# Patient Record
Sex: FEMALE | Race: WHITE | NOT HISPANIC OR LATINO
[De-identification: names, ages, dates, MRNs, and addresses within clinical notes are randomized per-mention and may not be internally consistent; named-entity substitution may affect disease eponyms.]

---
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
## 2024-03-11 ENCOUNTER — APPOINTMENT (OUTPATIENT)
Dept: HEART AND VASCULAR | Facility: CLINIC | Age: 19
End: 2024-03-11
Payer: COMMERCIAL

## 2024-03-11 DIAGNOSIS — M79.662 PAIN IN LEFT LOWER LEG: ICD-10-CM

## 2024-03-11 DIAGNOSIS — Q27.9 CONGENITAL MALFORMATION OF PERIPHERAL VASCULAR SYSTEM, UNSPECIFIED: ICD-10-CM

## 2024-03-11 PROCEDURE — 99204 OFFICE O/P NEW MOD 45 MIN: CPT

## 2024-03-11 NOTE — PHYSICAL EXAM
[Alert] : alert [No Acute Distress] : no acute distress [Well Nourished] : well nourished [Healthy Appearance] : healthy appearance [Normal Sclera/Conjunctiva] : normal sclera/conjunctiva [No Respiratory Distress] : no respiratory distress [No Edema] : there was no peripheral edema [No Varicosities] : there were no varicosital changes [Normal Gait] : normal gait [No Rash] : no rash [No Skin Lesions] : no skin lesions [Oriented x3] : oriented to person, place, and time [Fully active, able to carry on all pre-disease performance without restriction] : Fully active, able to carry on all pre-disease performance without restriction

## 2024-03-12 NOTE — DATA REVIEWED
[FreeTextEntry1] : Procedure history 2014 - DSE w/ STS, L calf venous malformation. Venogram w/ patent deep system.  2020 - DSE L calf

## 2024-03-12 NOTE — HISTORY OF PRESENT ILLNESS
[FreeTextEntry1] : Lilliam Hernandez is a 18-year-old female with L calf venous malformation who was last treated with DSE w/ STS in 2020. She also had prior treatment in 2014.Since her last visit she did not need any further treatment. She is here today for follow up and mild- moderate pain to LLE. She is able to do all her regular activities including hiking but feels achy when standing all day.  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 She uses compression stocking on her clinical days which helps with her symptoms. No imaging done since 2020. She is doing well otherwise with no allergies.  Plan: - DSE w/ bleomycin in May

## 2024-03-12 NOTE — END OF VISIT
[Time Spent: ___ minutes] : I have spent [unfilled] minutes of time on the encounter. [>50% of the face to face encounter time was spent on counseling and/or coordination of care for ___] : Greater than 50% of the face to face encounter time was spent on counseling and/or coordination of care for [unfilled] [FreeTextEntry3] : I, Dr. Rivas, personally performed the evaluation and management (E/M) services for this new patient who presents today with (a) new problem(s)/exacerbation of (an) existing condition(s). That E/M includes conducting the examination, assessing all new/exacerbated conditions, and establishing a new plan of care. Today, my ACP, Sindhu NICOLAS, was here to observe my evaluation and management services for this new problem/exacerbated condition to be followed going forward.

## 2024-03-12 NOTE — ASSESSMENT
[FreeTextEntry1] : =========================================================================== This is a 18 year old female otherwise in good health who we treated twice in the past for a painful intramuscular venous malformation in the lateral left calf. The last procedure was four years ago. 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 On physical exam the overlying skin is normal but she does have some slight fullness in the soft tissues in the area. It is minimally tender to palpation and neuromuscular function is intact. I did an ultrasound in the office which shows some residual compressible malformation confined to one muscle group. She has a previous scan from three years ago which confirms a localized intramuscular lesion. I told her that we could probably eliminate the remaining or recurrent pain with another treatment and she would like to go ahead with this. Given how visible the lesion is on ultrasound, another MRI is not necessary. We will most likely use bleomycin for the next procedure.  [Venous malformation] : venous malformation

## 2024-03-12 NOTE — REASON FOR VISIT
[Follow-Up: _____] : a [unfilled] follow-up visit [Parent] : parent [FreeTextEntry1] : L Calf venous malformation

## 2024-04-01 ENCOUNTER — APPOINTMENT (OUTPATIENT)
Dept: HEART AND VASCULAR | Facility: CLINIC | Age: 19
End: 2024-04-01

## 2024-05-10 VITALS
HEIGHT: 63 IN | RESPIRATION RATE: 16 BRPM | TEMPERATURE: 98 F | WEIGHT: 119.93 LBS | SYSTOLIC BLOOD PRESSURE: 120 MMHG | OXYGEN SATURATION: 99 % | DIASTOLIC BLOOD PRESSURE: 70 MMHG | HEART RATE: 80 BPM

## 2024-05-10 RX ORDER — CHLORHEXIDINE GLUCONATE 213 G/1000ML
1 SOLUTION TOPICAL ONCE
Refills: 0 | Status: DISCONTINUED | OUTPATIENT
Start: 2024-05-15 | End: 2024-05-29

## 2024-05-10 NOTE — H&P ADULT - RESPIRATORY
normal/clear to auscultation bilaterally/no wheezes/no rales/no rhonchi/no respiratory distress/no use of accessory muscles/breath sounds equal

## 2024-05-10 NOTE — H&P ADULT - NSHPLABSRESULTS_GEN_ALL_CORE
14.5   10.93 )-----------( 253      ( 15 May 2024 08:36 )             41.0       05-15    138  |  104  |  10  ----------------------------<  114<H>  4.2   |  21<L>  |  0.69    Ca    9.8      15 May 2024 08:36  Mg     1.8     05-15    TPro  6.9  /  Alb  4.5  /  TBili  0.8  /  DBili  x   /  AST  16  /  ALT  8<L>  /  AlkPhos  63  05-15      PT/INR - ( 15 May 2024 08:36 )   PT: 11.1 sec;   INR: 0.97          PTT - ( 15 May 2024 08:36 )  PTT:31.0 sec    Urinalysis Basic - ( 15 May 2024 08:36 )    Color: x / Appearance: x / SG: x / pH: x  Gluc: 114 mg/dL / Ketone: x  / Bili: x / Urobili: x   Blood: x / Protein: x / Nitrite: x   Leuk Esterase: x / RBC: x / WBC x   Sq Epi: x / Non Sq Epi: x / Bacteria: x 14.5   10.93 )-----------( 253      ( 15 May 2024 08:36 )             41.0       05-15    138  |  104  |  10  ----------------------------<  114<H>  4.2   |  21<L>  |  0.69    Ca    9.8      15 May 2024 08:36  Mg     1.8     05-15    TPro  6.9  /  Alb  4.5  /  TBili  0.8  /  DBili  x   /  AST  16  /  ALT  8<L>  /  AlkPhos  63  05-15      PT/INR - ( 15 May 2024 08:36 )   PT: 11.1 sec;   INR: 0.97          PTT - ( 15 May 2024 08:36 )  PTT:31.0 sec    Urinalysis Basic - ( 15 May 2024 08:36 )    Color: x / Appearance: x / SG: x / pH: x  Gluc: 114 mg/dL / Ketone: x  / Bili: x / Urobili: x   Blood: x / Protein: x / Nitrite: x   Leuk Esterase: x / RBC: x / WBC x   Sq Epi: x / Non Sq Epi: x / Bacteria: x    EKG: sinus arrythmia @ 72 bpm, no significant ST-T changes

## 2024-05-10 NOTE — H&P ADULT - CARDIOVASCULAR
normal/regular rate and rhythm/S1 S2 present/no gallops/no rub/no murmur/no JVD/peripheral edema/vascular

## 2024-05-10 NOTE — H&P ADULT - HISTORY OF PRESENT ILLNESS
IR: Dr. Elder Rivas  Pharmacy:  Escort:    18F with hx of Lt calf venous malformation s/p DSE with STS (2020), who presents to Dr. Rivas c/o mild-mod LLE pain -- worse with prolonged standing. Ultrasound at the office notable for residual compressible malformation confined into one muscle group.     In light of pt's risk factors and ongoing mild-mod LLE pain, pt now presents to [facility] for Lt calf DSE with Dr. Rivas.     Risks & benefits of procedure and alternative therapy have been explained to the patient by Dr. Rivas including but not limited to: allergic reaction, bleeding w/possible need for blood transfusion, infection, renal and vascular compromise, limb damage, emergent surgery. Informed consent obtained and in chart.   IR: Dr. Elder Rivas  Pharmacy: [facility] ([address] [place] [address] , [place] [address])  Escort: Mom ([employer])    18F with hx of Lt calf venous malformation s/p DSE with STS (2020) and prior tx 2014, who presents to Dr. Rivas c/o mild-mod LLE pain -- worse with prolonged standing, which causes achiness. 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 She states that using compression stockings has improved her symptoms. As per MD note, ultrasound at the office notable for residual compressible malformation confined into one muscle group. Previous scan from 3 years ago which confirms a localized intramuscular lesion. Given how visible the lesion is on ultrasound, additional MRI not necessary.     In light of pt's risk factors and ongoing mild-mod LLE pain, abnormal ultrasound pt now presents to [facility] for Lt calf DSE w/ bleomycin to be performed by Dr. Rivas.

## 2024-05-10 NOTE — H&P ADULT - ASSESSMENT
18F with hx of Lt calf venous malformation s/p DSE with STS (2020) and prior tx 2014, who presents to Dr. Rivas c/o mild-mod LLE pain -- worse with prolonged standing, which causes achiness. 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 She states that using compression stockings has improved her symptoms. As per MD note, ultrasound at the office notable for residual compressible malformation confined into one muscle group. Previous scan from 3 years ago which confirms a localized intramuscular lesion. Given how visible the lesion is on ultrasound, additional MRI not necessary.     In light of pt's risk factors and ongoing mild-mod LLE pain, abnormal ultrasound pt now presents to [facility] for Lt calf DSE w/ bleomycin to be performed by Dr. Rivas.      -ASA 2, Mallampati 3  - VSS  - Urine pregnancy test negative  - Consent to be obtained by Dr. Rivas    Risks & benefits of procedure and alternative therapy have been explained to the patient including but not limited to: allergic reaction, bleeding w/possible need for blood transfusion, infection, renal and vascular compromise, limb damage, emergent surgery.   18F with hx of Lt calf venous malformation s/p DSE with STS (2020) and prior tx 2014, who presents to Dr. Rivas c/o mild-mod LLE pain -- worse with prolonged standing, which causes achiness. 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 She states that using compression stockings has improved her symptoms. As per MD note, ultrasound at the office notable for residual compressible malformation confined into one muscle group. Previous scan from 3 years ago which confirms a localized intramuscular lesion. Given how visible the lesion is on ultrasound, additional MRI not necessary.     In light of pt's risk factors and ongoing mild-mod LLE pain, abnormal ultrasound pt now presents to [facility] for Lt calf DSE w/ bleomycin to be performed by Dr. Rivas.      -ASA 2, Mallampati 3  - VSS  - Urine pregnancy test negative  - Consent to be obtained by Dr. Rivas's team    Risks & benefits of procedure and alternative therapy have been explained to the patient including but not limited to: allergic reaction, bleeding w/possible need for blood transfusion, infection, renal and vascular compromise, limb damage, emergent surgery.   18F with hx of Lt calf venous malformation s/p DSE with STS (2020) and prior tx 2014, who presents to Dr. Rivas c/o mild-mod LLE pain -- worse with prolonged standing, which causes achiness. 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 She states that using compression stockings has improved her symptoms. As per MD note, ultrasound at the office notable for residual compressible malformation confined into one muscle group. Previous scan from 3 years ago which confirms a localized intramuscular lesion. Given how visible the lesion is on ultrasound, additional MRI not necessary.     In light of pt's risk factors and ongoing mild-mod LLE pain, abnormal ultrasound pt now presents to [facility] for Lt calf DSE w/ bleomycin to be performed by Dr. Rivas.      -ASA 2, Mallampati 3  - VSS  - Urine pregnancy test negative  - Mg 1.8 - ordered magnesium sulfate 800 mg PO x1  - Consent to be obtained by Dr. Rivas's team    Risks & benefits of procedure and alternative therapy have been explained to the patient including but not limited to: allergic reaction, bleeding w/possible need for blood transfusion, infection, renal and vascular compromise, limb damage, emergent surgery.   18F with hx of Lt calf venous malformation s/p DSE with STS (2020) and prior tx 2014, who presents to Dr. Rivas c/o mild-mod LLE pain -- worse with prolonged standing, which causes achiness. 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 She states that using compression stockings has improved her symptoms. As per MD note, ultrasound at the office notable for residual compressible malformation confined into one muscle group. Previous scan from 3 years ago which confirms a localized intramuscular lesion. Given how visible the lesion is on ultrasound, additional MRI not necessary.     In light of pt's risk factors and ongoing mild-mod LLE pain, abnormal ultrasound pt now presents to [facility] for Lt calf DSE w/ bleomycin to be performed by Dr. Rivas.      -ASA I, Mallampati I  - VSS  - Urine pregnancy test negative  - Mg 1.8 - ordered magnesium sulfate 800 mg PO x1  - Consent to be obtained by Dr. Rivas's team    Risks & benefits of procedure and alternative therapy have been explained to the patient including but not limited to: allergic reaction, bleeding w/possible need for blood transfusion, infection, renal and vascular compromise, limb damage, emergent surgery.

## 2024-05-15 ENCOUNTER — OUTPATIENT (OUTPATIENT)
Dept: OUTPATIENT SERVICES | Facility: HOSPITAL | Age: 19
LOS: 1 days | Discharge: HOME | End: 2024-05-15
Payer: COMMERCIAL

## 2024-05-15 VITALS
DIASTOLIC BLOOD PRESSURE: 70 MMHG | RESPIRATION RATE: 17 BRPM | OXYGEN SATURATION: 99 % | SYSTOLIC BLOOD PRESSURE: 120 MMHG | HEART RATE: 80 BPM | HEIGHT: 63 IN | WEIGHT: 119.93 LBS | TEMPERATURE: 98 F

## 2024-05-15 DIAGNOSIS — Q27.32 ARTERIOVENOUS MALFORMATION OF VESSEL OF LOWER LIMB: ICD-10-CM

## 2024-05-15 LAB
ALBUMIN SERPL ELPH-MCNC: 4.5 G/DL — SIGNIFICANT CHANGE UP (ref 3.3–5)
ALP SERPL-CCNC: 63 U/L — SIGNIFICANT CHANGE UP (ref 40–120)
ALT FLD-CCNC: 8 U/L — LOW (ref 10–45)
ANION GAP SERPL CALC-SCNC: 13 MMOL/L — SIGNIFICANT CHANGE UP (ref 5–17)
APTT BLD: 31 SEC — SIGNIFICANT CHANGE UP (ref 24.5–35.6)
AST SERPL-CCNC: 16 U/L — SIGNIFICANT CHANGE UP (ref 10–40)
BASOPHILS # BLD AUTO: 0.04 K/UL — SIGNIFICANT CHANGE UP (ref 0–0.2)
BASOPHILS NFR BLD AUTO: 0.4 % — SIGNIFICANT CHANGE UP (ref 0–2)
BILIRUB SERPL-MCNC: 0.8 MG/DL — SIGNIFICANT CHANGE UP (ref 0.2–1.2)
BUN SERPL-MCNC: 10 MG/DL — SIGNIFICANT CHANGE UP (ref 7–23)
CALCIUM SERPL-MCNC: 9.8 MG/DL — SIGNIFICANT CHANGE UP (ref 8.4–10.5)
CHLORIDE SERPL-SCNC: 104 MMOL/L — SIGNIFICANT CHANGE UP (ref 96–108)
CO2 SERPL-SCNC: 21 MMOL/L — LOW (ref 22–31)
CREAT SERPL-MCNC: 0.69 MG/DL — SIGNIFICANT CHANGE UP (ref 0.5–1.3)
EGFR: 127 ML/MIN/1.73M2 — SIGNIFICANT CHANGE UP
EOSINOPHIL # BLD AUTO: 0.03 K/UL — SIGNIFICANT CHANGE UP (ref 0–0.5)
EOSINOPHIL NFR BLD AUTO: 0.3 % — SIGNIFICANT CHANGE UP (ref 0–6)
GLUCOSE SERPL-MCNC: 114 MG/DL — HIGH (ref 70–99)
HCG UR QL: NEGATIVE — SIGNIFICANT CHANGE UP
HCT VFR BLD CALC: 41 % — SIGNIFICANT CHANGE UP (ref 34.5–45)
HGB BLD-MCNC: 14.5 G/DL — SIGNIFICANT CHANGE UP (ref 11.5–15.5)
IMM GRANULOCYTES NFR BLD AUTO: 0.2 % — SIGNIFICANT CHANGE UP (ref 0–0.9)
INR BLD: 0.97 — SIGNIFICANT CHANGE UP (ref 0.85–1.18)
LYMPHOCYTES # BLD AUTO: 1.81 K/UL — SIGNIFICANT CHANGE UP (ref 1–3.3)
LYMPHOCYTES # BLD AUTO: 16.6 % — SIGNIFICANT CHANGE UP (ref 13–44)
MAGNESIUM SERPL-MCNC: 1.8 MG/DL — SIGNIFICANT CHANGE UP (ref 1.6–2.6)
MCHC RBC-ENTMCNC: 30.8 PG — SIGNIFICANT CHANGE UP (ref 27–34)
MCHC RBC-ENTMCNC: 35.4 GM/DL — SIGNIFICANT CHANGE UP (ref 32–36)
MCV RBC AUTO: 87 FL — SIGNIFICANT CHANGE UP (ref 80–100)
MONOCYTES # BLD AUTO: 0.36 K/UL — SIGNIFICANT CHANGE UP (ref 0–0.9)
MONOCYTES NFR BLD AUTO: 3.3 % — SIGNIFICANT CHANGE UP (ref 2–14)
NEUTROPHILS # BLD AUTO: 8.67 K/UL — HIGH (ref 1.8–7.4)
NEUTROPHILS NFR BLD AUTO: 79.2 % — HIGH (ref 43–77)
NRBC # BLD: 0 /100 WBCS — SIGNIFICANT CHANGE UP (ref 0–0)
PLATELET # BLD AUTO: 253 K/UL — SIGNIFICANT CHANGE UP (ref 150–400)
POTASSIUM SERPL-MCNC: 4.2 MMOL/L — SIGNIFICANT CHANGE UP (ref 3.5–5.3)
POTASSIUM SERPL-SCNC: 4.2 MMOL/L — SIGNIFICANT CHANGE UP (ref 3.5–5.3)
PROT SERPL-MCNC: 6.9 G/DL — SIGNIFICANT CHANGE UP (ref 6–8.3)
PROTHROM AB SERPL-ACNC: 11.1 SEC — SIGNIFICANT CHANGE UP (ref 9.5–13)
RBC # BLD: 4.71 M/UL — SIGNIFICANT CHANGE UP (ref 3.8–5.2)
RBC # FLD: 12.9 % — SIGNIFICANT CHANGE UP (ref 10.3–14.5)
SODIUM SERPL-SCNC: 138 MMOL/L — SIGNIFICANT CHANGE UP (ref 135–145)
WBC # BLD: 10.93 K/UL — HIGH (ref 3.8–10.5)
WBC # FLD AUTO: 10.93 K/UL — HIGH (ref 3.8–10.5)

## 2024-05-15 PROCEDURE — 93005 ELECTROCARDIOGRAM TRACING: CPT

## 2024-05-15 PROCEDURE — 85610 PROTHROMBIN TIME: CPT

## 2024-05-15 PROCEDURE — 37241 VASC EMBOLIZE/OCCLUDE VENOUS: CPT

## 2024-05-15 PROCEDURE — 85025 COMPLETE CBC W/AUTO DIFF WBC: CPT

## 2024-05-15 PROCEDURE — 80053 COMPREHEN METABOLIC PANEL: CPT

## 2024-05-15 PROCEDURE — C1894: CPT

## 2024-05-15 PROCEDURE — 83735 ASSAY OF MAGNESIUM: CPT

## 2024-05-15 PROCEDURE — 81025 URINE PREGNANCY TEST: CPT

## 2024-05-15 PROCEDURE — C1889: CPT

## 2024-05-15 PROCEDURE — 85730 THROMBOPLASTIN TIME PARTIAL: CPT

## 2024-05-15 PROCEDURE — 93010 ELECTROCARDIOGRAM REPORT: CPT

## 2024-05-15 RX ORDER — ESCITALOPRAM OXALATE 10 MG/1
1 TABLET, FILM COATED ORAL
Refills: 0 | DISCHARGE

## 2024-05-15 RX ORDER — MAGNESIUM OXIDE 400 MG ORAL TABLET 241.3 MG
800 TABLET ORAL ONCE
Refills: 0 | Status: COMPLETED | OUTPATIENT
Start: 2024-05-15 | End: 2024-05-15

## 2024-05-15 RX ORDER — CEPHALEXIN 500 MG
1 CAPSULE ORAL
Qty: 28 | Refills: 0
Start: 2024-05-15 | End: 2024-05-21

## 2024-05-15 RX ORDER — HYDROMORPHONE HYDROCHLORIDE 2 MG/ML
0.5 INJECTION INTRAMUSCULAR; INTRAVENOUS; SUBCUTANEOUS
Refills: 0 | Status: DISCONTINUED | OUTPATIENT
Start: 2024-05-15 | End: 2024-05-15

## 2024-05-15 RX ORDER — NORETHINDRONE 0.35 MG/1
1 TABLET ORAL
Refills: 0 | DISCHARGE

## 2024-05-15 RX ORDER — OXYCODONE HYDROCHLORIDE 5 MG/1
1 TABLET ORAL
Qty: 12 | Refills: 0
Start: 2024-05-15 | End: 2024-05-17

## 2024-05-15 RX ORDER — SODIUM CHLORIDE 9 MG/ML
1000 INJECTION, SOLUTION INTRAVENOUS
Refills: 0 | Status: DISCONTINUED | OUTPATIENT
Start: 2024-05-15 | End: 2024-05-29

## 2024-05-15 RX ORDER — BLEOMYCIN SULFATE 30 UNIT
15 VIAL (EA) INJECTION ONCE
Refills: 0 | Status: DISCONTINUED | OUTPATIENT
Start: 2024-05-15 | End: 2024-05-29

## 2024-05-15 RX ADMIN — SODIUM CHLORIDE 100 MILLILITER(S): 9 INJECTION, SOLUTION INTRAVENOUS at 13:00

## 2024-05-15 RX ADMIN — HYDROMORPHONE HYDROCHLORIDE 0.5 MILLIGRAM(S): 2 INJECTION INTRAMUSCULAR; INTRAVENOUS; SUBCUTANEOUS at 15:54

## 2024-05-15 RX ADMIN — MAGNESIUM OXIDE 400 MG ORAL TABLET 800 MILLIGRAM(S): 241.3 TABLET ORAL at 15:54

## 2024-05-15 NOTE — PRE-ANESTHESIA EVALUATION ADULT - NSANTHOSAYNRD_GEN_A_CORE
No. NERISSA screening performed.  STOP BANG Legend: 0-2 = LOW Risk; 3-4 = INTERMEDIATE Risk; 5-8 = HIGH Risk

## 2024-05-15 NOTE — PRE-ANESTHESIA EVALUATION ADULT - WEIGHT IN LBS
SUICIDE/SELF-INJURIOUS BEHAVIOR SUICIDE/SELF-INJURIOUS BEHAVIOR SUICIDE/SELF-INJURIOUS BEHAVIOR 119.9 SUICIDE/SELF-INJURIOUS BEHAVIOR SUICIDE/SELF-INJURIOUS BEHAVIOR SUICIDE/SELF-INJURIOUS BEHAVIOR SUICIDE/SELF-INJURIOUS BEHAVIOR SUICIDE/SELF-INJURIOUS BEHAVIOR SUICIDE/SELF-INJURIOUS BEHAVIOR

## 2024-05-16 ENCOUNTER — TRANSCRIPTION ENCOUNTER (OUTPATIENT)
Age: 19
End: 2024-05-16
